# Patient Record
Sex: FEMALE | Race: WHITE
[De-identification: names, ages, dates, MRNs, and addresses within clinical notes are randomized per-mention and may not be internally consistent; named-entity substitution may affect disease eponyms.]

---

## 2021-04-17 ENCOUNTER — HOSPITAL ENCOUNTER (EMERGENCY)
Dept: HOSPITAL 50 - VM.ED | Age: 51
LOS: 1 days | Discharge: HOME | End: 2021-04-18
Payer: COMMERCIAL

## 2021-04-17 DIAGNOSIS — S80.12XA: ICD-10-CM

## 2021-04-17 DIAGNOSIS — S01.01XA: Primary | ICD-10-CM
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
## 2021-04-17 DIAGNOSIS — S60.222A: ICD-10-CM
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
## 2021-06-10 NOTE — EDM.PDOC
ED HPI GENERAL MEDICAL PROBLEM





- General


Chief Complaint: Gastrointestinal Problem


Stated Complaint: gastrointestinal


Time Seen by Provider: 06/10/21 10:00





- History of Present Illness


INITIAL COMMENTS - FREE TEXT/NARRATIVE: 





Patient comes emergency department today with complaints of inability to have a 

bowel movement and wondering if she does not have some internal hemorrhoids.  

Patient does not have a history of constipation.  Her last bowel movement was 

Sunday.  For the last couple of days she has felt like she has had to have a 

bowel movement but could not have it.  She is strained and attempted multiple 

times.  She has had a change in her diet she is eating a lot more red meat in 

the last 6 weeks.  She is not drinking as much fluids in the last 6 weeks nor is

she having as much exercise that she typically has in the last 6 weeks as she 

has moved and had quite a bit of change in her social life.  She does feel 

bloated.  She has some generalized abdominal cramping.  No nausea no vomiting.  

No hematuria dysuria or urinary frequency.  No black or tarry stools.  She is 

constipated.  She did try some hemorrhoidal cream last night as well as a fleets

suppository.  She has had intermittent fever she relates about 100.2 at home.


  ** Rectal


Pain Score (Numeric/FACES): 8





- Related Data


                                    Allergies











Allergy/AdvReac Type Severity Reaction Status Date / Time


 


No Known Allergies Allergy   Verified 06/10/21 10:05











Home Meds: 


                                    Home Meds





Losartan Potassium 50 mg PO DAILY 04/18/21 [History]


hydroCHLOROthiazide [Hydrochlorothiazide] 25 mg PO DAILY 04/18/21 [History]











Past Medical History


Cardiovascular History: Reports: Hypertension





Social & Family History





- Tobacco Use


Tobacco Use Status *Q: Never Tobacco User





ED ROS GENERAL





- Review of Systems


Review Of Systems: Comprehensive ROS is negative, except as noted in HPI.





ED EXAM, GI/ABD





- Physical Exam


Exam: See Below


Exam Limited By: No Limitations


General Appearance: Alert, WD/WN, No Apparent Distress


Neck: Normal Inspection


Respiratory/Chest: No Respiratory Distress, Lungs Clear, No Accessory Muscle 

Use, Chest Non-Tender


Cardiovascular: Normal Peripheral Pulses, Regular Rate, Rhythm


GI/Abdominal Exam: Soft, No Organomegaly, No Distention, No Mass, Pelvis Stable,

 Tender (She has some generalized tenderness throughout the abdomen.  Without 

guarding rebound or rigidity.).  No: Normal Bowel Sounds (Hypoactive), Guarding,

 Rigid, Rebound


 (Female) Exam: Deferred


Rectal (Female) Exam: Normal Rectal Tone, Fecal Impaction (With Renetta RN at the 

bedside rectal exam was completed.  Stool was grossly heme negative.  There is 

no external hemorrhoids.  There is no internal hemorrhoids.  There is a rather 

large ball of claylike stool in the rectal vault.).  No: Decreased Rectal Tone, 

Mass, Tenderness


Back Exam: Normal Inspection, Full Range of Motion


Extremities: Normal Inspection, Normal Range of Motion, No Pedal Edema, Normal 

Capillary Refill


Neurological: Alert, Oriented


Psychiatric: Normal Affect, Normal Mood


Skin Exam: Warm, Dry, Intact, Normal Color, No Rash





Course





- Vital Signs


Last Recorded V/S: 


                                Last Vital Signs











Temp  99.7 F   06/10/21 09:53


 


Pulse  129 H  06/10/21 09:53


 


Resp  18   06/10/21 09:53


 


BP  142/97 H  06/10/21 09:53


 


Pulse Ox  95   06/10/21 09:53














- Orders/Labs/Meds


Meds: 


Medications














Discontinued Medications














Generic Name Dose Route Start Last Admin





  Trade Name Murray  PRN Reason Stop Dose Admin


 


Bisacodyl  10 mg  06/10/21 11:46  06/10/21 11:56





  Bisacodyl 5 Mg Tab  PO  06/10/21 11:47  10 mg





  ONETIME ONE   Administration














- Re-Assessments/Exams


Free Text/Narrative Re-Assessment/Exam: 





06/10/21 


Patient was given a fleets enema.  About 20 minutes later she had a rather large

 amount of bowel movement.  She feels much better.  There was no blood with her 

stool.





She does not feel the bloating or the distention.  Her abdomen is does not feel 

crampy.  Reexamination of the abdomen shows regular bowel sounds.  Soft 

nontender nondistended.  Throughout all quadrants of the abdomen.





I do not feel that further work-up is needed at this time as the patient is 

asymptomatic following the bowel movement.  Reexamination her abdomen is benign 

at this time.  We will discharge her home with some oral bisacodyl and MiraLAX 

at home.  Continue with her increased fluids and exercise.  Return if anything 

new or worse especially recurrent abdominal pain fever nausea vomiting 

distention.  She is understanding this and her questions were answered.








Departure





- Departure


Time of Disposition: 11:45


Disposition: Home, Self-Care 01


Clinical Impression: 


Constipation


Qualifiers:


 Constipation type: unspecified constipation type Qualified Code(s): K59.00 - 

Constipation, unspecified








- Discharge Information


Instructions:  Constipation, Adult, Easy-to-Read


Referrals: 


Darline Nguyen NP [Primary Care Provider] - 


Forms:  ED Department Discharge


Additional Instructions: 


Continue increasing fluids. 


Increase your exercise. 


Miralax OTC 1 capful in a large glass of water daily. May need to increase by 1 

capful every 2 days by 1 capful until easy smoother bowel movements. 


Decrease as tolerated to easy smooth bowel movements. 


Return to the ED if new or worsening symptoms. 


Follow up with PCP in the next week if any continued concerns. 


Remember your screening colonoscopy is due next year. 





Sepsis Event Note (ED)





- Evaluation


Sepsis Screening Result: No Definite Risk





- Focused Exam


Vital Signs: 


                                   Vital Signs











  Temp Pulse Resp BP Pulse Ox


 


 06/10/21 09:53  99.7 F  129 H  18  142/97 H  95